# Patient Record
(demographics unavailable — no encounter records)

---
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
## 2025-01-30 NOTE — WORK
[Sprain/Strain] : sprain/strain [Was the competent medical cause of the injury] : was the competent medical cause of the injury [Are consistent with the injury] : are consistent with the injury [Consistent with my objective findings] : consistent with my objective findings [Mild Partial] : mild partial [Can return to work with limitations on ______] : can return to work with limitations on [unfilled] [Rx may affect patient's ability to return to work, make patient drowsy, or other issue] : Rx may affect patient's ability to return to work, make patient drowsy, or other issue. [I provided the services listed above] :  I provided the services listed above.

## 2025-01-30 NOTE — IMAGING
[de-identified] : LSPINE Inspection: skin intact Palpation: tenderness to palpation in bilateral lumbar paraspinal musculature, no SI joint tenderness to palpation ROM: Full with stiffness Strength: 5/5 bilateral hip flexors, knee extensors, ankle dorsiflexors, EHL, ankle plantarflexors Sensation: Sensation present to light touch bilateral L2-S1 distributions Provocative maneuvers: Negative bilateral straight leg raise  TTP over coccyx  [Facet arthropathy] : Facet arthropathy [Disc space narrowing] : Disc space narrowing [Fracture] : Fracture [FreeTextEntry1] : Retrolisthesis L5/S1
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
## 2025-03-07 NOTE — ASSESSMENT
[FreeTextEntry1] : Fx coccyx on XR  no STIR signal noted within sacrum or coccyx on MRI, likely chronic fracture that has healed, does have burden of soft tissue edema in that region though Discussed indication for surgery.  meds f/u as symptoms dictate   NSAIDs- Patient warned of risk of medication to GI tract, increased blood pressure, cardiac risk, and risk of fluid retention.  Advised to clear medication with internist or PCP if any concurrent health problem with heart, blood pressure, or GI system exists.
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
## 2025-03-07 NOTE — IMAGING
[Facet arthropathy] : Facet arthropathy [Disc space narrowing] : Disc space narrowing [Fracture] : Fracture [de-identified] : LSPINE Palpation: tenderness to palpation in bilateral lumbar paraspinal musculature, no SI joint tenderness to palpation ROM: Full with stiffness Strength: 5/5 bilateral hip flexors, knee extensors, ankle dorsiflexors, EHL, ankle plantarflexors Sensation: Sensation present to light touch bilateral L2-S1 distributions Provocative maneuvers: Negative bilateral straight leg raise  TTP over coccyx  [FreeTextEntry1] : Retrolisthesis L5/S1

## 2025-06-06 NOTE — ASSESSMENT
[FreeTextEntry1] : Fx coccyx on XR  no STIR signal noted within sacrum or coccyx on MRI, likely chronic fracture that has healed, does have burden of soft tissue edema in that region though Discussed indication for surgery.  Mobic 15 mg qd prescribed and will start a course of PT f/u 4 weeks   NSAIDs- Patient warned of risk of medication to GI tract, increased blood pressure, cardiac risk, and risk of fluid retention.  Advised to clear medication with internist or PCP if any concurrent health problem with heart, blood pressure, or GI system exists.
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
## 2025-06-06 NOTE — IMAGING
[Facet arthropathy] : Facet arthropathy [Disc space narrowing] : Disc space narrowing [Fracture] : Fracture [de-identified] : LSPINE Palpation: tenderness to palpation in bilateral lumbar paraspinal musculature, b/l SI joint tenderness  ROM: limited in all planes  Strength: 5/5 bilateral hip flexors, knee extensors, ankle dorsiflexors, EHL, ankle plantarflexors Sensation: Sensation present to light touch bilateral L2-S1 distributions Provocative maneuvers: Negative bilateral straight leg raise  TTP over coccyx  [FreeTextEntry1] : Retrolisthesis L5/S1
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
## 2025-07-11 NOTE — ASSESSMENT
[FreeTextEntry1] : Fx coccyx on XR  no STIR signal noted within sacrum or coccyx on MRI, likely chronic fracture that has healed, does have burden of soft tissue edema in that region though Discussed indication for surgery.  meloxicam renewed  PT renewed follow up PRN  NSAIDs- Patient warned of risk of medication to GI tract, increased blood pressure, cardiac risk, and risk of fluid retention.  Advised to clear medication with internist or PCP if any concurrent health problem with heart, blood pressure, or GI system exists.

## 2025-07-11 NOTE — IMAGING
[Facet arthropathy] : Facet arthropathy [Disc space narrowing] : Disc space narrowing [Fracture] : Fracture [de-identified] : LSPINE Palpation: tenderness to palpation in bilateral lumbar paraspinal musculature, no SI joint tenderness to palpation ROM: Full with stiffness Strength: 5/5 bilateral hip flexors, knee extensors, ankle dorsiflexors, EHL, ankle plantarflexors Sensation: Sensation present to light touch bilateral L2-S1 distributions Provocative maneuvers: Negative bilateral straight leg raise  TTP over coccyx  [FreeTextEntry1] : Retrolisthesis L5/S1